# Patient Record
(demographics unavailable — no encounter records)

---

## 2025-07-30 NOTE — HISTORY OF PRESENT ILLNESS
[de-identified] : patient is about one month out from their joint replacement.  overall doing well.  no major complaints.  pain well controlled.

## 2025-07-30 NOTE — DISCUSSION/SUMMARY
[de-identified] : one month out from joint replacement doing well we discussed medications, activities, precautions and fu

## 2025-07-30 NOTE — PHYSICAL EXAM
[de-identified] : incision is well healed, clean dry and intact hip with painless rom normal gait no motor or sensory deficits and neurovasculary intact no clubbing, cyanosis or edema  [de-identified] : 2 views left hip show well positioned implants